# Patient Record
Sex: MALE | Race: WHITE
[De-identification: names, ages, dates, MRNs, and addresses within clinical notes are randomized per-mention and may not be internally consistent; named-entity substitution may affect disease eponyms.]

---

## 2017-05-08 ENCOUNTER — HOSPITAL ENCOUNTER (OUTPATIENT)
Dept: HOSPITAL 45 - C.LAB1850 | Age: 67
Discharge: HOME | End: 2017-05-08
Attending: INTERNAL MEDICINE
Payer: COMMERCIAL

## 2017-05-08 DIAGNOSIS — E03.9: ICD-10-CM

## 2017-05-08 DIAGNOSIS — E55.9: ICD-10-CM

## 2017-05-08 DIAGNOSIS — E04.2: ICD-10-CM

## 2017-05-08 DIAGNOSIS — I10: ICD-10-CM

## 2017-05-08 DIAGNOSIS — Z86.31: ICD-10-CM

## 2017-05-08 DIAGNOSIS — E06.3: ICD-10-CM

## 2017-05-08 DIAGNOSIS — E10.649: Primary | ICD-10-CM

## 2017-05-08 DIAGNOSIS — E78.5: ICD-10-CM

## 2017-05-08 DIAGNOSIS — R20.0: ICD-10-CM

## 2017-05-08 LAB
CREAT UR-MCNC: 150 MG/DL
EST. AVERAGE GLUCOSE BLD GHB EST-MCNC: 177 MG/DL
RATIO: 4.8 MCG/MG (ref 0–30)
TSH SERPL-ACNC: 1.16 UIU/ML (ref 0.3–4.5)

## 2017-08-21 ENCOUNTER — HOSPITAL ENCOUNTER (OUTPATIENT)
Dept: HOSPITAL 45 - C.LAB1850 | Age: 67
Discharge: HOME | End: 2017-08-21
Attending: INTERNAL MEDICINE
Payer: COMMERCIAL

## 2017-08-21 DIAGNOSIS — E78.5: Primary | ICD-10-CM

## 2017-08-21 DIAGNOSIS — E10.9: ICD-10-CM

## 2017-08-22 LAB — EST. AVERAGE GLUCOSE BLD GHB EST-MCNC: 174 MG/DL

## 2017-11-29 ENCOUNTER — HOSPITAL ENCOUNTER (OUTPATIENT)
Dept: HOSPITAL 45 - C.LAB1850 | Age: 67
Discharge: HOME | End: 2017-11-29
Attending: INTERNAL MEDICINE
Payer: COMMERCIAL

## 2017-11-29 DIAGNOSIS — E10.9: Primary | ICD-10-CM

## 2017-11-29 LAB — EST. AVERAGE GLUCOSE BLD GHB EST-MCNC: 163 MG/DL

## 2018-04-30 ENCOUNTER — HOSPITAL ENCOUNTER (OUTPATIENT)
Dept: HOSPITAL 45 - C.LAB1850 | Age: 68
Discharge: HOME | End: 2018-04-30
Attending: INTERNAL MEDICINE
Payer: COMMERCIAL

## 2018-04-30 DIAGNOSIS — E04.2: ICD-10-CM

## 2018-04-30 DIAGNOSIS — E03.9: ICD-10-CM

## 2018-04-30 DIAGNOSIS — E78.5: Primary | ICD-10-CM

## 2018-04-30 DIAGNOSIS — E55.9: ICD-10-CM

## 2018-04-30 DIAGNOSIS — E10.9: ICD-10-CM

## 2018-04-30 LAB
ALBUMIN SERPL-MCNC: 3.3 GM/DL (ref 3.4–5)
ALP SERPL-CCNC: 118 U/L (ref 45–117)
ALT SERPL-CCNC: 46 U/L (ref 12–78)
AST SERPL-CCNC: 34 U/L (ref 15–37)
BUN SERPL-MCNC: 16 MG/DL (ref 7–18)
CALCIUM SERPL-MCNC: 8.5 MG/DL (ref 8.5–10.1)
CO2 SERPL-SCNC: 32 MMOL/L (ref 21–32)
CREAT SERPL-MCNC: 1.18 MG/DL (ref 0.6–1.4)
CREAT UR-MCNC: 148 MG/DL
GLUCOSE SERPL-MCNC: 178 MG/DL (ref 70–99)
KETONES UR QL STRIP: 42 MG/DL
PH UR: 88 MG/DL (ref 0–200)
POTASSIUM SERPL-SCNC: 4.3 MMOL/L (ref 3.5–5.1)
PROT SERPL-MCNC: 6.8 GM/DL (ref 6.4–8.2)
SODIUM SERPL-SCNC: 139 MMOL/L (ref 136–145)

## 2018-05-01 LAB — HBA1C MFR BLD: 7.9 % (ref 4.5–5.6)

## 2018-05-02 ENCOUNTER — HOSPITAL ENCOUNTER (OUTPATIENT)
Dept: HOSPITAL 45 - C.ULTRBC | Age: 68
Discharge: HOME | End: 2018-05-02
Attending: INTERNAL MEDICINE
Payer: COMMERCIAL

## 2018-05-02 DIAGNOSIS — E04.2: Primary | ICD-10-CM

## 2018-05-02 NOTE — DIAGNOSTIC IMAGING REPORT
SOFT TISS HEAD/NECK-THYROID



HISTORY: Multinodular thyroid  E04.2 Multiple thyroid [id number]



COMPARISON:  5/13/2015



FINDINGS:



Right lobe:  Maximum dimension 4.6 cm. Heterogeneous internal architecture with

no well-defined nodule.



Left lobe:  Maximum dimension 4.0 cm. Heterogeneous internal architecture with

no well-defined nodule



Isthmus:  No nodules.



IMPRESSION:  

Diffusely heterogeneous thyroid with no well-defined nodular density. This is

unchanged compared to the prior study. 









The above report was generated using voice recognition software.  It may contain

grammatical, syntax or spelling errors.







Electronically signed by:  Henok Edwards M.D.

5/2/2018 12:44 PM



Dictated Date/Time:  5/2/2018 12:38 PM